# Patient Record
Sex: FEMALE | Race: WHITE | Employment: PART TIME | URBAN - METROPOLITAN AREA
[De-identification: names, ages, dates, MRNs, and addresses within clinical notes are randomized per-mention and may not be internally consistent; named-entity substitution may affect disease eponyms.]

---

## 2018-10-09 ENCOUNTER — APPOINTMENT (OUTPATIENT)
Dept: GENERAL RADIOLOGY | Age: 43
End: 2018-10-09
Attending: FAMILY MEDICINE
Payer: COMMERCIAL

## 2018-10-09 ENCOUNTER — HOSPITAL ENCOUNTER (OUTPATIENT)
Age: 43
Discharge: HOME | End: 2018-10-09
Attending: FAMILY MEDICINE
Payer: COMMERCIAL

## 2018-10-09 VITALS
WEIGHT: 125 LBS | HEART RATE: 119 BPM | OXYGEN SATURATION: 99 % | HEIGHT: 65 IN | TEMPERATURE: 100 F | DIASTOLIC BLOOD PRESSURE: 100 MMHG | RESPIRATION RATE: 16 BRPM | BODY MASS INDEX: 20.83 KG/M2 | SYSTOLIC BLOOD PRESSURE: 170 MMHG

## 2018-10-09 DIAGNOSIS — S61.412A LACERATION OF LEFT HAND, FOREIGN BODY PRESENCE UNSPECIFIED, INITIAL ENCOUNTER: Primary | ICD-10-CM

## 2018-10-09 PROCEDURE — 90471 IMMUNIZATION ADMIN: CPT

## 2018-10-09 PROCEDURE — 12002 RPR S/N/AX/GEN/TRNK2.6-7.5CM: CPT

## 2018-10-09 PROCEDURE — 99204 OFFICE O/P NEW MOD 45 MIN: CPT

## 2018-10-09 PROCEDURE — 73130 X-RAY EXAM OF HAND: CPT | Performed by: FAMILY MEDICINE

## 2018-10-09 PROCEDURE — 73660 X-RAY EXAM OF TOE(S): CPT | Performed by: FAMILY MEDICINE

## 2018-10-10 NOTE — ED PROVIDER NOTES
[place] [id number] [facility] [place]    History   Patient presents with:  Laceration    Stated Complaint: L. Hand Laceration    HPI    51-year-old female presents for left hand laceration.   States about 1 hour ago, she was in the shower and sh Psychiatric: She has a normal mood and affect. ED Course   Labs Reviewed - No data to display      MDM     Patient presents for laceration on the left hand. X ray left hand was done and was unremarkable.  Lidocaine without epi was infiltrated along

## 2018-10-10 NOTE — ED INITIAL ASSESSMENT (HPI)
Pt presents tonight with c/o laceration to top of left hand that occurred just pta. Pt states that she was in the shower and that the shower has a glass door. Pt states that the glass shattered and cut the top of her left hand.  Pt with laceration to top of

## 2024-01-22 ENCOUNTER — TELEPHONE (OUTPATIENT)
Dept: FAMILY MEDICINE CLINIC | Facility: CLINIC | Age: 49
End: 2024-01-22

## 2024-01-22 NOTE — TELEPHONE ENCOUNTER
Pt has appt on wed, she is hoping to get in tomorrow, she cannot get rid of the 100.3 fever, please call pt if Dr GRAY will see her tomorrow.

## 2024-01-22 NOTE — TELEPHONE ENCOUNTER
Future Appointments   Date Time Provider Department Center   1/23/2024  9:00 AM Daljit Valladares MD [facility] [place]

## 2024-01-22 NOTE — TELEPHONE ENCOUNTER
PT CALLED AND ADV HAS BEEN IN BEEN IN [place] THE LAST 3 DAYS - WOULD LIKE TO SEE DR HANCOCK.    HASN'T BEEN SEEN IN MANY MANY YEARS - IS  WILLING TO TAKE ON PT. PT ADV  SEE'S ALL THE CHILDREN.    PLEASE ADV    THANK YOU

## 2024-01-23 ENCOUNTER — HOSPITAL ENCOUNTER (OUTPATIENT)
Dept: GENERAL RADIOLOGY | Age: 49
Discharge: HOME | End: 2024-01-23
Attending: FAMILY MEDICINE

## 2024-01-23 ENCOUNTER — OFFICE VISIT (OUTPATIENT)
Dept: FAMILY MEDICINE CLINIC | Facility: CLINIC | Age: 49
End: 2024-01-23
Payer: COMMERCIAL

## 2024-01-23 VITALS
OXYGEN SATURATION: 98 % | WEIGHT: 139 LBS | TEMPERATURE: 98 F | BODY MASS INDEX: 23 KG/M2 | DIASTOLIC BLOOD PRESSURE: 120 MMHG | HEART RATE: 133 BPM | SYSTOLIC BLOOD PRESSURE: 140 MMHG | RESPIRATION RATE: 16 BRPM

## 2024-01-23 DIAGNOSIS — R05.1 ACUTE COUGH: Primary | ICD-10-CM

## 2024-01-23 DIAGNOSIS — R00.0 TACHYCARDIA: ICD-10-CM

## 2024-01-23 DIAGNOSIS — R05.1 ACUTE COUGH: ICD-10-CM

## 2024-01-23 DIAGNOSIS — R03.0 ELEVATED BLOOD PRESSURE READING: ICD-10-CM

## 2024-01-23 PROCEDURE — 3080F DIAST BP >= 90 MM HG: CPT | Performed by: FAMILY MEDICINE

## 2024-01-23 PROCEDURE — 71046 X-RAY EXAM CHEST 2 VIEWS: CPT | Performed by: FAMILY MEDICINE

## 2024-01-23 PROCEDURE — 99203 OFFICE O/P NEW LOW 30 MIN: CPT | Performed by: FAMILY MEDICINE

## 2024-01-23 PROCEDURE — 3077F SYST BP >= 140 MM HG: CPT | Performed by: FAMILY MEDICINE

## 2024-01-23 RX ORDER — BUDESONIDE 1 MG/2ML
1 INHALANT ORAL 2 TIMES DAILY
Qty: 30 EACH | Refills: 0 | Status: SHIPPED | OUTPATIENT
Start: 2024-01-23

## 2024-01-23 NOTE — PROGRESS NOTES
Denisse Parham is a 48 year old female.    CC:    Chief Complaint   Patient presents with    Chest Congestion       HPI:  Dry cough for about 4 days. Feels achy. + temps to around 100.5F. + flanagan. Has been in a nursing home multiple times the past 2 weeks. No chest pains. No home COVID testing done. Motrin/Tylenol of some help for aches and fevers.   Allergies:  No Known Allergies   Current Meds:  none     History:  No past medical history on file.   No past surgical history on file.   No family history on file.   Family Status   Relation Status    Mo Alive    Fa Alive      Social History     Socioeconomic History    Marital status:    Tobacco Use    Smoking status: Never    Smokeless tobacco: Never   Substance and Sexual Activity    Alcohol use: Yes     Comment: rare    Drug use: Never        ROS:  General: lower energy, drinking plenty of water   (female): making urine hourly    Vitals: BP (!) 140/120 (BP Location: Left arm, Patient Position: Sitting, Cuff Size: adult)   Pulse (!) 133   Temp 98.2 °F (36.8 °C)   Resp 16   Wt 139 lb (63 kg)   SpO2 98%   BMI 23.13 kg/m²    Reviewed by RUSH Valladares M.D.    Physical Exam:  GEN: mildly ill appearing  EYE: B conjunctiva and lids normal  HENT: normocephalic; normal nose, pharynx and TM's  NECK: No lymphadenopathy, thyromegaly or masses  CAR: S1, S2 normal, RRR; no S3, no S4; no click; murmur negative  PULM: clear to auscultation B, no accessory muscle use, + hoarse voice and dry cough during interview.  GI: not examined  PSYCH: alert and oriented x 3; affect appropriate  SKIN: not examined  BREAST: not examined/not applicable  EXTREMITIES: No clubbing, cyanosis or edema  RECTAL: not examined  GENITAL: not examined  LYMPH: no supraclavicular nodes  MUSCULOSKELETAL: normal ambulation  NEURO: Awake and alert. Normal speech and articulation. No facial droop or asymmetry. Moving all extremities equally.    ASSESSMENT AND PLAN    1. Acute cough  Pulmicort  Report given to HD RN Vandana. Pt to receive dialysis in ED. Plan of care reviewed with pt and daughter. Pt and daughter demonstrated understanding through teach back. Blood transfusion consent obtained by MD. Pt comfort maintained. nebs  Await results   To ER if sx worsen  - XR CHEST PA + LAT CHEST (CPT=71046); Future  - SARS-CoV-2/Flu A and B/RSV by PCR ([facility]) [E]; Future  - SARS-CoV-2/Flu A and B/RSV by PCR ([facility]) [E]    2. Elevated blood pressure reading  She has had a previously elevated BP in 2018 when reviewing the record. Her sisters have HTN. Denisse is likely a hypertensive as well. I have recommended she f/u when feeling well for a complete px to evaluate this further    3. Tachycardia  Likely due to current illness  We will have her re evaluated in near future when feeling better      No follow-ups on file.    Orders for this visit:    No orders of the defined types were placed in this encounter.      None    Meds & Refills for this Visit:  Requested Prescriptions     Signed Prescriptions Disp Refills    Budesonide (PULMICORT) 1 MG/2ML Inhalation Suspension 30 each 0     Sig: Take 2 mL (1 mg total) by nebulization 2 (two) times daily.             Authorized by Daljit Valladares M.D.

## 2024-01-24 LAB
FLUAV + FLUBV RNA SPEC NAA+PROBE: DETECTED
FLUAV + FLUBV RNA SPEC NAA+PROBE: NOT DETECTED
RSV RNA SPEC NAA+PROBE: NOT DETECTED
SARS-COV-2 RNA RESP QL NAA+PROBE: NOT DETECTED

## 2024-01-24 RX ORDER — OSELTAMIVIR PHOSPHATE 75 MG/1
75 CAPSULE ORAL 2 TIMES DAILY
Qty: 10 CAPSULE | Refills: 0 | Status: SHIPPED | OUTPATIENT
Start: 2024-01-24 | End: 2024-01-29

## 2024-08-21 ENCOUNTER — TELEPHONE (OUTPATIENT)
Dept: FAMILY MEDICINE CLINIC | Facility: CLINIC | Age: 49
End: 2024-08-21

## 2024-08-21 NOTE — TELEPHONE ENCOUNTER
PATIENT WAS INSTRUCTED TO SCHEDULE BP CHECK AND LABS WITH DR OLIVEIRA.  SHE IS SCHEDULED FOR FRIDAY FOR BP CHECK.  I DON'T SEE ANY LAB ORDERS.  PATIENT SAYS IT WAS A CASUAL CONVERSATION AFTER HER LAST APPOINTMENT TO FOLLOW UP.  DOES DR OLIVEIRA WANT PATIENT TO COME FASTING ON FRIDAY MORNING FOR LABS? DID DR OLIVEIRA NEED TO SEE HER OR IS THIS NURSE ONLY FOR BP CHECK.

## 2024-08-23 ENCOUNTER — OFFICE VISIT (OUTPATIENT)
Dept: FAMILY MEDICINE CLINIC | Facility: CLINIC | Age: 49
End: 2024-08-23
Payer: COMMERCIAL

## 2024-08-23 VITALS
WEIGHT: 139 LBS | SYSTOLIC BLOOD PRESSURE: 152 MMHG | TEMPERATURE: 98 F | OXYGEN SATURATION: 99 % | DIASTOLIC BLOOD PRESSURE: 102 MMHG | BODY MASS INDEX: 23 KG/M2 | HEART RATE: 135 BPM

## 2024-08-23 DIAGNOSIS — Z13.220 LIPID SCREENING: ICD-10-CM

## 2024-08-23 DIAGNOSIS — R00.0 TACHYCARDIA: ICD-10-CM

## 2024-08-23 DIAGNOSIS — R03.0 ELEVATED BLOOD PRESSURE READING: Primary | ICD-10-CM

## 2024-08-23 DIAGNOSIS — Z12.11 COLON CANCER SCREENING: ICD-10-CM

## 2024-08-23 DIAGNOSIS — Z12.31 BREAST CANCER SCREENING BY MAMMOGRAM: ICD-10-CM

## 2024-08-23 DIAGNOSIS — E04.1 THYROID CYST: ICD-10-CM

## 2024-08-23 LAB
ALT SERPL-CCNC: 12 U/L
ANION GAP SERPL CALC-SCNC: 3 MMOL/L (ref 0–18)
AST SERPL-CCNC: 16 U/L (ref ?–34)
ATRIAL RATE: 87 BPM
BUN BLD-MCNC: 9 MG/DL (ref 9–23)
CALCIUM BLD-MCNC: 10.2 MG/DL (ref 8.7–10.4)
CHLORIDE SERPL-SCNC: 102 MMOL/L (ref 98–112)
CHOLEST SERPL-MCNC: 192 MG/DL (ref ?–200)
CO2 SERPL-SCNC: 31 MMOL/L (ref 21–32)
CREAT BLD-MCNC: 0.76 MG/DL
EGFRCR SERPLBLD CKD-EPI 2021: 97 ML/MIN/1.73M2 (ref 60–?)
ERYTHROCYTE [DISTWIDTH] IN BLOOD BY AUTOMATED COUNT: 16.7 %
FASTING PATIENT LIPID ANSWER: YES
FASTING STATUS PATIENT QL REPORTED: YES
GLUCOSE BLD-MCNC: 99 MG/DL (ref 70–99)
HCT VFR BLD AUTO: 39.8 %
HDLC SERPL-MCNC: 75 MG/DL (ref 40–59)
HGB BLD-MCNC: 12.1 G/DL
LDLC SERPL CALC-MCNC: 104 MG/DL (ref ?–100)
MCH RBC QN AUTO: 24.3 PG (ref 26–34)
MCHC RBC AUTO-ENTMCNC: 30.4 G/DL (ref 31–37)
MCV RBC AUTO: 80.1 FL
NONHDLC SERPL-MCNC: 117 MG/DL (ref ?–130)
OSMOLALITY SERPL CALC.SUM OF ELEC: 281 MOSM/KG (ref 275–295)
P AXIS: 66 DEGREES
P-R INTERVAL: 150 MS
PLATELET # BLD AUTO: 361 10(3)UL (ref 150–450)
POTASSIUM SERPL-SCNC: 4.7 MMOL/L (ref 3.5–5.1)
Q-T INTERVAL: 370 MS
QRS DURATION: 82 MS
QTC CALCULATION (BEZET): 445 MS
R AXIS: -14 DEGREES
RBC # BLD AUTO: 4.97 X10(6)UL
SODIUM SERPL-SCNC: 136 MMOL/L (ref 136–145)
T AXIS: 27 DEGREES
T4 FREE SERPL-MCNC: 1.9 NG/DL (ref 0.8–1.7)
TRIGL SERPL-MCNC: 72 MG/DL (ref 30–149)
TSI SER-ACNC: 0.74 MIU/ML (ref 0.55–4.78)
VENTRICULAR RATE: 87 BPM
VLDLC SERPL CALC-MCNC: 12 MG/DL (ref 0–30)
WBC # BLD AUTO: 6.2 X10(3) UL (ref 4–11)

## 2024-08-23 PROCEDURE — 3080F DIAST BP >= 90 MM HG: CPT | Performed by: FAMILY MEDICINE

## 2024-08-23 PROCEDURE — 84439 ASSAY OF FREE THYROXINE: CPT | Performed by: FAMILY MEDICINE

## 2024-08-23 PROCEDURE — 84460 ALANINE AMINO (ALT) (SGPT): CPT | Performed by: FAMILY MEDICINE

## 2024-08-23 PROCEDURE — 85027 COMPLETE CBC AUTOMATED: CPT | Performed by: FAMILY MEDICINE

## 2024-08-23 PROCEDURE — 93000 ELECTROCARDIOGRAM COMPLETE: CPT | Performed by: FAMILY MEDICINE

## 2024-08-23 PROCEDURE — 80048 BASIC METABOLIC PNL TOTAL CA: CPT | Performed by: FAMILY MEDICINE

## 2024-08-23 PROCEDURE — 84443 ASSAY THYROID STIM HORMONE: CPT | Performed by: FAMILY MEDICINE

## 2024-08-23 PROCEDURE — 84450 TRANSFERASE (AST) (SGOT): CPT | Performed by: FAMILY MEDICINE

## 2024-08-23 PROCEDURE — 99214 OFFICE O/P EST MOD 30 MIN: CPT | Performed by: FAMILY MEDICINE

## 2024-08-23 PROCEDURE — 80061 LIPID PANEL: CPT | Performed by: FAMILY MEDICINE

## 2024-08-23 PROCEDURE — 3077F SYST BP >= 140 MM HG: CPT | Performed by: FAMILY MEDICINE

## 2024-08-23 NOTE — PROGRESS NOTES
Denisse Parham is a 48 year old female.    CC:    Chief Complaint   Patient presents with    Follow - Up       HPI:  F/u elevated BP from earlier this year. She has been tracking it the past 6 weeks. Review of the BP readings show that she is > 140/90 about 50% of the time. She is also noting a racing heart. No chest pain nor syncope. She does not drink caffeine, no decongestant use. No energy supplements. No tob use. Rare etoh. She admits to being a higher energy person. She has a strong family hx of HTN. She recalls being on synthroid about 25 years ago. She also states she had a thyroid bx on a R sided nodule/cyst about 25 years ago. She is due for cervical, breast and colon cancer screens.     Allergies:  No Known Allergies   Current Meds:  Current Outpatient Medications   Medication Sig Dispense Refill    Budesonide (PULMICORT) 1 MG/2ML Inhalation Suspension Take 2 mL (1 mg total) by nebulization 2 (two) times daily. 30 each 0        History:  No past medical history on file.   No past surgical history on file.   No family history on file.   Family Status   Relation Status    Mo Alive    Fa Alive      Social History     Socioeconomic History    Marital status:    Tobacco Use    Smoking status: Never    Smokeless tobacco: Never   Substance and Sexual Activity    Alcohol use: Yes     Comment: rare    Drug use: Never        ROS:  General: energy level stable  Cardiovascular/Pulses: Denies exertional chest pain or pressure, lower extremity edema  Respiratory: Denies dyspnea, wheezing. She does get  slight cough when her heart rate is elevated.     Vitals: BP (!) 148/102   Pulse (!) 135   Temp 97.7 °F (36.5 °C) (Temporal)   Wt 139 lb (63 kg)   LMP 08/16/2024 (Exact Date)   SpO2 99%   BMI 23.13 kg/m²    Reviewed by RUSH Valladares M.D.  BP Readings from Last 3 Encounters:   08/23/24 (!) 152/102   01/23/24 (!) 140/120   10/09/18 (!) 170/100      Physical Exam:  GEN: well developed, well nourished, in no  apparent distress  EYE: B conjunctiva and lids normal  HENT: normocephalic; normal nose, pharynx and TM's  NECK: R sided thyroid mass, No cervical nodes  CAR: S1, S2 normal, tachycardic but regular; no S3, no S4; no click; murmur negative  PULM: clear to auscultation B, no accessory muscle use  GI: normal active BS+, soft, nondistended; no HSM; no masses; no bruits; no masses; nontender, no G/R/R   PSYCH: alert and oriented x 3; affect appropriate  SKIN: not examined  EXTREMITIES: No clubbing, cyanosis or edema  GENITAL: not examined  LYMPH: no supraclavicular nodes  NEURO: Awake and alert. Normal speech and articulation. No facial droop or asymmetry. Moving all extremities equally. Symmetric B patellar DTRs, no hand tremor    ASSESSMENT AND PLAN    1. Elevated blood pressure reading  Await results, if fine then I will likely recommend BB to help with BP and tachycardia    - TSH and Free T4; Future  - ELECTROCARDIOGRAM, COMPLETE  - ALT(SGPT)  - AST (SGOT)  - Basic Metabolic Panel (8)  - CBC, Platelet; No Differential  - Lipid Panel  - TSH and Free T4    2. Tachycardia  As above     - ELECTROCARDIOGRAM, COMPLETE  - ALT(SGPT)  - AST (SGOT)  - Basic Metabolic Panel (8)  - CBC, Platelet; No Differential  - Lipid Panel  - TSH and Free T4    3. Thyroid cyst  Await results   - US THYROID (CPT=76536); Future    4. Breast cancer screening by mammogram  Await results   - [facility] CARMINE 2D+3D SCREENING BILAT (CPT=77067/05524); Future    5. Colon cancer screening  Await results   - COLOGUARD COLON CANCER SCREENING (EXTERNAL)    6. Lipid screening  Await results   - Lipid Panel; Future  - Lipid Panel    7. Cervical Cancer Screening  I recommended she meet with one of our female providers in the office to accomplish a Pap smear and HPV screen      No follow-ups on file.    Orders for this visit:    Orders Placed This Encounter   Procedures    ALT(SGPT)     Standing Status:   Future     Number of Occurrences:   1     Standing Expiration  Date:   8/23/2025    AST (SGOT)     Standing Status:   Future     Number of Occurrences:   1     Standing Expiration Date:   8/23/2025    Basic Metabolic Panel (8)     Standing Status:   Future     Number of Occurrences:   1     Standing Expiration Date:   8/23/2025    CBC, Platelet; No Differential     Standing Status:   Future     Number of Occurrences:   1     Standing Expiration Date:   8/23/2025    Lipid Panel     Standing Status:   Future     Number of Occurrences:   1     Standing Expiration Date:   8/23/2025    TSH and Free T4     Standing Status:   Future     Number of Occurrences:   1     Standing Expiration Date:   8/23/2025    VENIPUNCTURE     Order Specific Question:   Release to patient     Answer:   Immediate       COLOGUARD COLON CANCER SCREENING (EXTERNAL)  ELECTROCARDIOGRAM, COMPLETE  [facility] CARMINE 2D+3D SCREENING BILAT (CPT=77067/98319)  US THYROID (CPT=76536)    Meds & Refills for this Visit:  Requested Prescriptions      No prescriptions requested or ordered in this encounter             Authorized by Daljit Valladares M.D.

## 2024-08-24 DIAGNOSIS — R79.89 ELEVATED SERUM FREE T4 LEVEL: Primary | ICD-10-CM

## 2024-08-24 DIAGNOSIS — E04.1 THYROID CYST: ICD-10-CM

## 2024-08-24 DIAGNOSIS — R00.0 TACHYCARDIA: ICD-10-CM

## 2024-08-24 RX ORDER — METOPROLOL SUCCINATE 25 MG/1
25 TABLET, EXTENDED RELEASE ORAL DAILY
Qty: 90 TABLET | Refills: 0 | Status: SHIPPED | OUTPATIENT
Start: 2024-08-24

## 2024-08-26 ENCOUNTER — LAB ENCOUNTER (OUTPATIENT)
Dept: LAB | Age: 49
End: 2024-08-26
Attending: FAMILY MEDICINE
Payer: COMMERCIAL

## 2024-08-26 DIAGNOSIS — E04.1 THYROID NODULE: ICD-10-CM

## 2024-08-26 DIAGNOSIS — R79.89 ELEVATED SERUM FREE T4 LEVEL: ICD-10-CM

## 2024-08-26 DIAGNOSIS — R00.0 TACHYCARDIA: ICD-10-CM

## 2024-08-26 DIAGNOSIS — R79.0 LOW FERRITIN: Primary | ICD-10-CM

## 2024-08-26 DIAGNOSIS — E04.1 THYROID CYST: ICD-10-CM

## 2024-08-26 LAB
DEPRECATED HBV CORE AB SER IA-ACNC: 2.4 NG/ML
T4 FREE SERPL-MCNC: 1.3 NG/DL (ref 0.8–1.7)
THYROGLOB SERPL-MCNC: <15 U/ML (ref ?–60)
THYROPEROXIDASE AB SERPL-ACNC: <28 U/ML (ref ?–60)
TSI SER-ACNC: 0.7 MIU/ML (ref 0.55–4.78)

## 2024-08-26 PROCEDURE — 82728 ASSAY OF FERRITIN: CPT | Performed by: FAMILY MEDICINE

## 2024-08-26 PROCEDURE — 86376 MICROSOMAL ANTIBODY EACH: CPT | Performed by: FAMILY MEDICINE

## 2024-08-26 PROCEDURE — 84443 ASSAY THYROID STIM HORMONE: CPT | Performed by: FAMILY MEDICINE

## 2024-08-26 PROCEDURE — 83520 IMMUNOASSAY QUANT NOS NONAB: CPT | Performed by: FAMILY MEDICINE

## 2024-08-26 PROCEDURE — 86800 THYROGLOBULIN ANTIBODY: CPT | Performed by: FAMILY MEDICINE

## 2024-08-26 PROCEDURE — 84445 ASSAY OF TSI GLOBULIN: CPT | Performed by: FAMILY MEDICINE

## 2024-08-26 PROCEDURE — 84439 ASSAY OF FREE THYROXINE: CPT | Performed by: FAMILY MEDICINE

## 2024-08-29 LAB
THY STIM IMMUNO: <0.1 IU/L
THYROTROPIN REC AB: <1.1 IU/L

## 2024-09-12 ENCOUNTER — HOSPITAL ENCOUNTER (OUTPATIENT)
Dept: ULTRASOUND IMAGING | Age: 49
Discharge: HOME | End: 2024-09-12
Attending: FAMILY MEDICINE
Payer: COMMERCIAL

## 2024-09-12 DIAGNOSIS — E04.1 THYROID CYST: ICD-10-CM

## 2024-09-12 PROCEDURE — 76536 US EXAM OF HEAD AND NECK: CPT | Performed by: FAMILY MEDICINE

## 2024-09-18 ENCOUNTER — OFFICE VISIT (OUTPATIENT)
Dept: FAMILY MEDICINE CLINIC | Facility: CLINIC | Age: 49
End: 2024-09-18
Payer: COMMERCIAL

## 2024-09-18 VITALS
HEART RATE: 103 BPM | OXYGEN SATURATION: 97 % | WEIGHT: 138.81 LBS | SYSTOLIC BLOOD PRESSURE: 148 MMHG | BODY MASS INDEX: 23 KG/M2 | DIASTOLIC BLOOD PRESSURE: 88 MMHG | TEMPERATURE: 99 F

## 2024-09-18 DIAGNOSIS — I10 HYPERTENSION, UNSPECIFIED TYPE: Primary | ICD-10-CM

## 2024-09-18 DIAGNOSIS — R79.0 LOW FERRITIN: ICD-10-CM

## 2024-09-18 DIAGNOSIS — E04.1 THYROID NODULE: ICD-10-CM

## 2024-09-18 PROCEDURE — 99214 OFFICE O/P EST MOD 30 MIN: CPT | Performed by: FAMILY MEDICINE

## 2024-09-18 PROCEDURE — 84443 ASSAY THYROID STIM HORMONE: CPT | Performed by: FAMILY MEDICINE

## 2024-09-18 PROCEDURE — 82728 ASSAY OF FERRITIN: CPT | Performed by: FAMILY MEDICINE

## 2024-09-18 PROCEDURE — 3077F SYST BP >= 140 MM HG: CPT | Performed by: FAMILY MEDICINE

## 2024-09-18 PROCEDURE — 84439 ASSAY OF FREE THYROXINE: CPT | Performed by: FAMILY MEDICINE

## 2024-09-18 PROCEDURE — 3079F DIAST BP 80-89 MM HG: CPT | Performed by: FAMILY MEDICINE

## 2024-09-18 RX ORDER — METOPROLOL SUCCINATE 25 MG/1
25 TABLET, EXTENDED RELEASE ORAL 2 TIMES DAILY
COMMUNITY
Start: 2024-09-18

## 2024-09-18 NOTE — PROGRESS NOTES
Denisse Parham is a 48 year old female.    CC:    Chief Complaint   Patient presents with    Follow - Up     On BP.        HPI:  Here to follow up hypertension  Home BP readings: 120s-160s/ 80s-101  Medication side effects: none  Symptoms: headache  Circumstantial factors: none    Lab Results   Component Value Date    GLU 99 08/23/2024    BUN 9 08/23/2024    CREATSERUM 0.76 08/23/2024    ANIONGAP 3 08/23/2024    CA 10.2 08/23/2024     08/23/2024    K 4.7 08/23/2024     08/23/2024    CO2 31.0 08/23/2024    OSMOCALC 281 08/23/2024       BP Readings from Last 3 Encounters:   09/18/24 148/88   08/23/24 (!) 152/102   01/23/24 (!) 140/120      Allergies:  No Known Allergies   Current Meds:  Current Outpatient Medications   Medication Sig Dispense Refill    metoprolol succinate ER (TOPROL XL) 25 MG Oral Tablet 24 Hr Take 1 tablet (25 mg total) by mouth daily. 90 tablet 0    Budesonide (PULMICORT) 1 MG/2ML Inhalation Suspension Take 2 mL (1 mg total) by nebulization 2 (two) times daily. 30 each 0        History:  No past medical history on file.   No past surgical history on file.   No family history on file.   Family Status   Relation Status    Mo Alive    Fa Alive      Social History     Socioeconomic History    Marital status:    Tobacco Use    Smoking status: Never    Smokeless tobacco: Never   Substance and Sexual Activity    Alcohol use: Yes     Comment: rare    Drug use: Never        ROS:  General: energy level stable  ENT: will be seeing ENT for a complex thyroid nodule  Heme: needs f/u ferritin, taking iron supplement as directed     Vitals: /88   Pulse 103   Temp 98.7 °F (37.1 °C) (Temporal)   Wt 138 lb 12.8 oz (63 kg)   LMP 09/11/2024 (Approximate)   SpO2 97%   BMI 23.10 kg/m²    Reviewed by RUSH Valladares M.D.    Physical Exam:  GEN: well developed, well nourished, in no apparent distress  EYE: B conjunctiva and lids normal  HENT: No oral lesions.   NECK: R thyroid nodule, no  LAD  CAR: S1, S2 normal, RRR; no S3, no S4; no click; murmur negative  PULM: clear to auscultation B, no accessory muscle use  GI: not examined  PSYCH: alert and oriented x 3; affect appropriate  SKIN: not examined  EXTREMITIES: No clubbing, cyanosis or edema  GENITAL: not examined  LYMPH: no supraclavicular nodes  NEURO: Awake and alert. Normal speech and articulation. No facial droop or asymmetry. Moving all extremities equally.    ASSESSMENT AND PLAN    1. Hypertension, unspecified type  Increase Toprol to BID dose  F/u in 4 weeks    2. Thyroid nodule  Await results   Keep appt with ENT  - TSH and Free T4    3. Low ferritin  As above   - Ferritin      No follow-ups on file.    Orders for this visit:    No orders of the defined types were placed in this encounter.      None    Meds & Refills for this Visit:  Requested Prescriptions      No prescriptions requested or ordered in this encounter             Authorized by Daljit Valladares M.D.

## 2024-09-19 LAB
DEPRECATED HBV CORE AB SER IA-ACNC: 11.5 NG/ML
T4 FREE SERPL-MCNC: 1.7 NG/DL (ref 0.8–1.7)
TSI SER-ACNC: 0.73 MIU/ML (ref 0.55–4.78)

## 2024-10-17 ENCOUNTER — OFFICE VISIT (OUTPATIENT)
Dept: FAMILY MEDICINE CLINIC | Facility: CLINIC | Age: 49
End: 2024-10-17
Payer: COMMERCIAL

## 2024-10-17 ENCOUNTER — PATIENT MESSAGE (OUTPATIENT)
Dept: FAMILY MEDICINE CLINIC | Facility: CLINIC | Age: 49
End: 2024-10-17

## 2024-10-17 VITALS
WEIGHT: 143.38 LBS | BODY MASS INDEX: 24 KG/M2 | TEMPERATURE: 98 F | SYSTOLIC BLOOD PRESSURE: 155 MMHG | HEART RATE: 94 BPM | DIASTOLIC BLOOD PRESSURE: 100 MMHG | OXYGEN SATURATION: 99 %

## 2024-10-17 DIAGNOSIS — R79.0 LOW FERRITIN: ICD-10-CM

## 2024-10-17 DIAGNOSIS — I10 HYPERTENSION, UNSPECIFIED TYPE: Primary | ICD-10-CM

## 2024-10-17 LAB — DEPRECATED HBV CORE AB SER IA-ACNC: 10 NG/ML

## 2024-10-17 PROCEDURE — 3080F DIAST BP >= 90 MM HG: CPT | Performed by: FAMILY MEDICINE

## 2024-10-17 PROCEDURE — 82728 ASSAY OF FERRITIN: CPT | Performed by: FAMILY MEDICINE

## 2024-10-17 PROCEDURE — 3077F SYST BP >= 140 MM HG: CPT | Performed by: FAMILY MEDICINE

## 2024-10-17 PROCEDURE — 99214 OFFICE O/P EST MOD 30 MIN: CPT | Performed by: FAMILY MEDICINE

## 2024-10-17 RX ORDER — OLMESARTAN MEDOXOMIL 20 MG/1
20 TABLET ORAL DAILY
Qty: 90 TABLET | Refills: 0 | Status: SHIPPED | OUTPATIENT
Start: 2024-10-17

## 2024-10-17 RX ORDER — METOPROLOL SUCCINATE 25 MG/1
25 TABLET, EXTENDED RELEASE ORAL 2 TIMES DAILY
Qty: 180 TABLET | Refills: 0 | Status: SHIPPED | OUTPATIENT
Start: 2024-10-17

## 2024-10-17 NOTE — TELEPHONE ENCOUNTER
PT CALLED AND ADV IS OUT OF MEDICATION - FORGOT TO REQUEST AT APT:    metoprolol succinate ER (TOPROL XL) 25 MG Oral Tablet 24 Hr -- -- 9/18/2024 --   Sig:   Take 1 tablet (25 mg total) by mouth 2 (two) times daily.       PLEASE SEND TO MARLY CORREA    THANK YOU

## 2024-10-17 NOTE — PROGRESS NOTES
Denisse Parham is a 48 year old female.    CC:    Chief Complaint   Patient presents with    Blood Pressure       HPI:  Here to follow up hypertension  Home BP readings: majority of time above 140/90  Medication side effects: fatigue  Symptoms: headache  Circumstantial factors: none    Lab Results   Component Value Date    GLU 99 08/23/2024    BUN 9 08/23/2024    CREATSERUM 0.76 08/23/2024    ANIONGAP 3 08/23/2024    CA 10.2 08/23/2024     08/23/2024    K 4.7 08/23/2024     08/23/2024    CO2 31.0 08/23/2024    OSMOCALC 281 08/23/2024       BP Readings from Last 3 Encounters:   10/17/24 (!) 160/100   09/18/24 148/88   08/23/24 (!) 152/102      Allergies:  Allergies[1]   Current Meds:  Current Outpatient Medications   Medication Sig Dispense Refill    metoprolol succinate ER (TOPROL XL) 25 MG Oral Tablet 24 Hr Take 1 tablet (25 mg total) by mouth 2 (two) times daily.      Budesonide (PULMICORT) 1 MG/2ML Inhalation Suspension Take 2 mL (1 mg total) by nebulization 2 (two) times daily. 30 each 0        History:  No past medical history on file.   No past surgical history on file.   No family history on file.   Family Status   Relation Status    Mo Alive    Fa Alive      Social History     Socioeconomic History    Marital status:    Tobacco Use    Smoking status: Never    Smokeless tobacco: Never   Substance and Sexual Activity    Alcohol use: Yes     Comment: rare    Drug use: Never        ROS:  General: appetite stable   Cardiovascular/Pulses: Denies chest pain    Vitals: BP (!) 160/100   Pulse 94   Temp 98.3 °F (36.8 °C) (Temporal)   Wt 143 lb 6.4 oz (65 kg)   LMP 10/04/2024 (Approximate)   SpO2 99%   BMI 23.86 kg/m²    Reviewed by RUSH Valladares M.D.    Physical Exam:  GEN: well developed, well nourished, in no apparent distress  EYE: B conjunctiva and lids normal  HENT: ---  NECK: No lymphadenopathy, thyromegaly or masses  CAR: S1, S2 normal, RRR; no S3, no S4; no click; murmur  negative  PULM: clear to auscultation B, no accessory muscle use  GI: not examined  PSYCH: alert and oriented x 3; affect appropriate  SKIN: not examined  EXTREMITIES: No clubbing, cyanosis or edema  GENITAL: not examined  LYMPH: no supraclavicular nodes  NEURO: Awake and alert. Normal speech and articulation. No facial droop or asymmetry. Moving all extremities equally.    ASSESSMENT AND PLAN    1. Hypertension, unspecified type  Add:  - olmesartan 20 MG Oral Tab; Take 1 tablet (20 mg total) by mouth daily.  Dispense: 90 tablet; Refill: 0    Continue BB    She will MC me home BP readings in 2 weeks. If still consistently > 140/90 then will increase Olmesartan to 40 mg every day   2. Low ferritin  Await results   - Ferritin; Future  - VENIPUNCTURE  - Ferritin      No follow-ups on file.    Orders for this visit:    Orders Placed This Encounter   Procedures    Ferritin     Standing Status:   Future     Number of Occurrences:   1     Standing Expiration Date:   10/17/2025    VENIPUNCTURE     Order Specific Question:   Release to patient     Answer:   Immediate       None    Meds & Refills for this Visit:  Requested Prescriptions     Signed Prescriptions Disp Refills    olmesartan 20 MG Oral Tab 90 tablet 0     Sig: Take 1 tablet (20 mg total) by mouth daily.             Authorized by Daljit Valladares M.D.           [1] No Known Allergies

## 2024-10-18 ENCOUNTER — PATIENT MESSAGE (OUTPATIENT)
Dept: FAMILY MEDICINE CLINIC | Facility: CLINIC | Age: 49
End: 2024-10-18

## 2024-10-21 ENCOUNTER — OFFICE VISIT (OUTPATIENT)
Facility: LOCATION | Age: 49
End: 2024-10-21
Payer: COMMERCIAL

## 2024-10-21 ENCOUNTER — PATIENT MESSAGE (OUTPATIENT)
Dept: FAMILY MEDICINE CLINIC | Facility: CLINIC | Age: 49
End: 2024-10-21

## 2024-10-21 DIAGNOSIS — E04.2 MULTIPLE THYROID NODULES: Primary | ICD-10-CM

## 2024-10-21 PROCEDURE — 99204 OFFICE O/P NEW MOD 45 MIN: CPT | Performed by: OTOLARYNGOLOGY

## 2024-10-21 NOTE — PROGRESS NOTES
Denisse Parham is a 48 year old female.   Chief Complaint   Patient presents with    Thyroid Problem     HPI:   48-year-old white female has known about thyroid nodules for about 25 years.  Around that time she had a cyst aspirated.  She had it reevaluated recently and she has cyst and/or nodules bilaterally.  She has a family history in which her aunt had her thyroid removed for nodules but no cancer.  Patient does not take thyroid medications.  Has no history of radiation to the head neck area.  These nodules are asymptomatic.  Current Outpatient Medications   Medication Sig Dispense Refill    olmesartan 20 MG Oral Tab Take 1 tablet (20 mg total) by mouth daily. 90 tablet 0    metoprolol succinate ER (TOPROL XL) 25 MG Oral Tablet 24 Hr Take 1 tablet (25 mg total) by mouth 2 (two) times daily. 180 tablet 0    Budesonide (PULMICORT) 1 MG/2ML Inhalation Suspension Take 2 mL (1 mg total) by nebulization 2 (two) times daily. 30 each 0      History reviewed. No pertinent past medical history.   Social History:  Social History     Socioeconomic History    Marital status:    Tobacco Use    Smoking status: Never    Smokeless tobacco: Never   Substance and Sexual Activity    Alcohol use: Yes     Comment: rare    Drug use: Never      History reviewed. No pertinent surgical history.      REVIEW OF SYSTEMS:   GENERAL HEALTH: feels well otherwise  GENERAL : denies fever, chills, sweats, weight loss, weight gain  SKIN: denies any unusual skin lesions or rashes  RESPIRATORY: denies shortness of breath with exertion  NEURO: denies headaches    EXAM:   LMP 10/04/2024 (Approximate)     System Pertinent findings Details   Constitutional  Overall appearance - Normal.   Psychiatric  Orientation - Oriented to time, place, person & situation. Appropriate mood and affect.   Head/Face  Facial features -- Normal. Skull - Normal.   Eyes  Pupils equal ,round ,react to light and accomidate   Ears  External Ear Right: Normal, Left:  Normal. Canal - Right: Normal, Left: Normal. TM - Right: Normal left: Normal   Nose  External Nose, Normal, Septum -midline nasal Vault, clear,Turbinates - Right: Normal left: Normal   Mouth/Throat  Lips/teeth/gums - Normal. Tonsils -1+. Oropharynx - Normal.   Neck Exam  Inspection - Normal. Palpation - Normal. Parotid gland - Normal. Thyroid gland -easily palpable right thyroid nodule other nodules are not so easy to palpate.   Neurological  Cranial nerves - Cranial nerves II through XII grossly intact.   Nasopharynx   Normal        Lymph Detail  Submental. Submandibular. Anterior cervical. Posterior cervical. Supraclavicular.   Thyroid ultrasound dated 9/12/2024  Right thyroid  3 x 2.7 x 2 cm right midpole complex  7 x 6 x 7 mm right superior pole  Left thyroid  11 x 7 x 7 mm left midpole with calcifications solid    ASSESSMENT AND PLAN:   1. Multiple thyroid nodules  Biopsy of the largest nodule right and left  Results over the phone pending results further recommendation        The patient indicates understanding of these issues and agrees to the plan.      Walter Melvin MD  10/21/2024  4:45 PM

## 2024-10-29 ENCOUNTER — HOSPITAL ENCOUNTER (OUTPATIENT)
Dept: ULTRASOUND IMAGING | Facility: HOSPITAL | Age: 49
Discharge: HOME | End: 2024-10-29
Attending: OTOLARYNGOLOGY
Payer: COMMERCIAL

## 2024-10-29 DIAGNOSIS — E04.2 MULTIPLE THYROID NODULES: ICD-10-CM

## 2024-10-29 PROCEDURE — 10006 FNA BX W/US GDN EA ADDL: CPT | Performed by: OTOLARYNGOLOGY

## 2024-10-29 PROCEDURE — 10005 FNA BX W/US GDN 1ST LES: CPT | Performed by: OTOLARYNGOLOGY

## 2024-10-29 PROCEDURE — 88173 CYTOPATH EVAL FNA REPORT: CPT | Performed by: OTOLARYNGOLOGY

## 2024-11-01 ENCOUNTER — PATIENT MESSAGE (OUTPATIENT)
Dept: FAMILY MEDICINE CLINIC | Facility: CLINIC | Age: 49
End: 2024-11-01

## 2024-11-11 ENCOUNTER — TELEPHONE (OUTPATIENT)
Facility: LOCATION | Age: 49
End: 2024-11-11

## 2024-11-11 NOTE — TELEPHONE ENCOUNTER
Results of recent fine-needle aspiration thyroid biopsies x 2 were given to the patient there were both benign.  Therefore recommending a 1 year follow-up thyroid ultrasound with a return office visit patient understood these results and will be sent a reminder via [facility]

## 2025-01-03 ENCOUNTER — OFFICE VISIT (OUTPATIENT)
Dept: FAMILY MEDICINE CLINIC | Facility: CLINIC | Age: 50
End: 2025-01-03
Payer: COMMERCIAL

## 2025-01-03 VITALS
TEMPERATURE: 98 F | BODY MASS INDEX: 24 KG/M2 | WEIGHT: 142.19 LBS | OXYGEN SATURATION: 97 % | SYSTOLIC BLOOD PRESSURE: 110 MMHG | HEART RATE: 81 BPM | DIASTOLIC BLOOD PRESSURE: 80 MMHG

## 2025-01-03 DIAGNOSIS — I10 HYPERTENSION, UNSPECIFIED TYPE: Primary | ICD-10-CM

## 2025-01-03 DIAGNOSIS — R79.0 LOW FERRITIN: ICD-10-CM

## 2025-01-03 LAB
ANION GAP SERPL CALC-SCNC: 5 MMOL/L (ref 0–18)
BUN BLD-MCNC: 14 MG/DL (ref 9–23)
CALCIUM BLD-MCNC: 9.7 MG/DL (ref 8.7–10.4)
CHLORIDE SERPL-SCNC: 104 MMOL/L (ref 98–112)
CO2 SERPL-SCNC: 29 MMOL/L (ref 21–32)
CREAT BLD-MCNC: 0.78 MG/DL
DEPRECATED HBV CORE AB SER IA-ACNC: 15 NG/ML
EGFRCR SERPLBLD CKD-EPI 2021: 93 ML/MIN/1.73M2 (ref 60–?)
ERYTHROCYTE [DISTWIDTH] IN BLOOD BY AUTOMATED COUNT: 12.2 %
FASTING STATUS PATIENT QL REPORTED: YES
GLUCOSE BLD-MCNC: 84 MG/DL (ref 70–99)
HCT VFR BLD AUTO: 44.1 %
HGB BLD-MCNC: 14.7 G/DL
MCH RBC QN AUTO: 31.9 PG (ref 26–34)
MCHC RBC AUTO-ENTMCNC: 33.3 G/DL (ref 31–37)
MCV RBC AUTO: 95.7 FL
OSMOLALITY SERPL CALC.SUM OF ELEC: 286 MOSM/KG (ref 275–295)
PLATELET # BLD AUTO: 260 10(3)UL (ref 150–450)
POTASSIUM SERPL-SCNC: 4.3 MMOL/L (ref 3.5–5.1)
RBC # BLD AUTO: 4.61 X10(6)UL
SODIUM SERPL-SCNC: 138 MMOL/L (ref 136–145)
WBC # BLD AUTO: 5.4 X10(3) UL (ref 4–11)

## 2025-01-03 PROCEDURE — 3079F DIAST BP 80-89 MM HG: CPT | Performed by: FAMILY MEDICINE

## 2025-01-03 PROCEDURE — 80048 BASIC METABOLIC PNL TOTAL CA: CPT | Performed by: FAMILY MEDICINE

## 2025-01-03 PROCEDURE — 85027 COMPLETE CBC AUTOMATED: CPT | Performed by: FAMILY MEDICINE

## 2025-01-03 PROCEDURE — 3074F SYST BP LT 130 MM HG: CPT | Performed by: FAMILY MEDICINE

## 2025-01-03 PROCEDURE — 99214 OFFICE O/P EST MOD 30 MIN: CPT | Performed by: FAMILY MEDICINE

## 2025-01-03 PROCEDURE — 82728 ASSAY OF FERRITIN: CPT | Performed by: FAMILY MEDICINE

## 2025-01-03 RX ORDER — METOPROLOL SUCCINATE 25 MG/1
25 TABLET, EXTENDED RELEASE ORAL DAILY
COMMUNITY
Start: 2025-01-03

## 2025-01-03 NOTE — PROGRESS NOTES
Denisse Parham is a 49 year old female.    CC:    Chief Complaint   Patient presents with    Follow - Up       HPI:  Here to follow up hypertension. Olmesartan added to BB about 2 months ago.  Home BP readings: Consistently < 140/90  Medication side effects: she does note a profound tiredness about an hour after taking the night time BB. She also doses Olmesartan at night  Symptoms: as above  Circumstantial factors: none    Lab Results   Component Value Date    GLU 99 08/23/2024    BUN 9 08/23/2024    CREATSERUM 0.76 08/23/2024    ANIONGAP 3 08/23/2024    CA 10.2 08/23/2024     08/23/2024    K 4.7 08/23/2024     08/23/2024    CO2 31.0 08/23/2024    OSMOCALC 281 08/23/2024       BP Readings from Last 3 Encounters:   01/03/25 110/80   10/17/24 (!) 155/100   09/18/24 148/88        Allergies as of 01/03/2025    (No Known Allergies)        Current Meds:  Current Outpatient Medications   Medication Sig Dispense Refill    olmesartan 20 MG Oral Tab Take 1 tablet (20 mg total) by mouth daily. 90 tablet 0    metoprolol succinate ER (TOPROL XL) 25 MG Oral Tablet 24 Hr Take 1 tablet (25 mg total) by mouth 2 (two) times daily. 180 tablet 0    Budesonide (PULMICORT) 1 MG/2ML Inhalation Suspension Take 2 mL (1 mg total) by nebulization 2 (two) times daily. 30 each 0        History:  History reviewed. No pertinent past medical history.   History reviewed. No pertinent surgical history.   History reviewed. No pertinent family history.   Family Status   Relation Status    Mo Alive    Fa Alive      Social History     Socioeconomic History    Marital status:    Tobacco Use    Smoking status: Never    Smokeless tobacco: Never   Substance and Sexual Activity    Alcohol use: Yes     Comment: rare    Drug use: Never        ROS:  General: wt stable   Cardiovascular/Pulses: Denies exertional chest pain or pressure  Heme: due for f/u ferritin level, she did not tolerate BID iron supplement as it caused GI upset.      Vitals: /80   Pulse 81   Temp 97.9 °F (36.6 °C) (Temporal)   Wt 142 lb 3.2 oz (64.5 kg)   LMP 12/16/2024 (Approximate)   SpO2 97%   BMI 23.66 kg/m²    Reviewed by RUSH Valladares M.D.    Physical Exam:  GEN: well developed, well nourished, in no apparent distress  EYE: B conjunctiva and lids normal  HENT: No oral lesions.   NECK: No lymphadenopathy, thyromegaly or masses  CAR: S1, S2 normal, RRR; no S3, no S4; no click; murmur negative  PULM: clear to auscultation B, no accessory muscle use  GI: not examined  PSYCH: alert and oriented x 3; affect appropriate  SKIN: not examined  EXTREMITIES: No clubbing, cyanosis or edema  GENITAL: not examined  LYMPH: no supraclavicular nodes  NEURO: Awake and alert. Normal speech and articulation. No facial droop or asymmetry. Moving all extremities equally.    ASSESSMENT AND PLAN    1. Hypertension, unspecified type  Nicely improved  Will drop the PM dose of BB  She is to My Chart Message me BP readings in 2-3 weeks  Await results   - Basic Metabolic Panel (8); Future    2. Low ferritin  Await results   - VENIPUNCTURE  - CBC, Platelet; No Differential; Future  - Ferritin; Future      No follow-ups on file.    Orders for this visit:    Orders Placed This Encounter   Procedures    CBC, Platelet; No Differential     Standing Status:   Future     Standing Expiration Date:   1/3/2026    Basic Metabolic Panel (8)     Standing Status:   Future     Standing Expiration Date:   1/3/2026    Ferritin     Standing Status:   Future     Standing Expiration Date:   1/3/2026    VENIPUNCTURE     Order Specific Question:   Release to patient     Answer:   Immediate       None    Meds & Refills for this Visit:  Requested Prescriptions      No prescriptions requested or ordered in this encounter             Authorized by Daljit Valladares M.D.

## 2025-01-11 DIAGNOSIS — I10 HYPERTENSION, UNSPECIFIED TYPE: ICD-10-CM

## 2025-01-12 RX ORDER — OLMESARTAN MEDOXOMIL 20 MG/1
20 TABLET ORAL DAILY
Qty: 90 TABLET | Refills: 2 | Status: SHIPPED | OUTPATIENT
Start: 2025-01-12

## 2025-01-25 ENCOUNTER — PATIENT MESSAGE (OUTPATIENT)
Dept: FAMILY MEDICINE CLINIC | Facility: CLINIC | Age: 50
End: 2025-01-25

## 2025-02-01 RX ORDER — METOPROLOL SUCCINATE 25 MG/1
25 TABLET, EXTENDED RELEASE ORAL DAILY
Qty: 90 TABLET | Refills: 1 | Status: SHIPPED | OUTPATIENT
Start: 2025-02-01

## 2025-04-05 DIAGNOSIS — I10 HYPERTENSION, UNSPECIFIED TYPE: ICD-10-CM

## 2025-04-07 RX ORDER — OLMESARTAN MEDOXOMIL 20 MG/1
20 TABLET ORAL DAILY
Qty: 90 TABLET | Refills: 1 | Status: SHIPPED | OUTPATIENT
Start: 2025-04-07

## 2025-04-27 RX ORDER — METOPROLOL SUCCINATE 25 MG/1
25 TABLET, EXTENDED RELEASE ORAL DAILY
Qty: 90 TABLET | Refills: 1 | Status: SHIPPED | OUTPATIENT
Start: 2025-04-27

## 2025-06-26 DIAGNOSIS — I10 HYPERTENSION, UNSPECIFIED TYPE: ICD-10-CM

## 2025-06-30 RX ORDER — OLMESARTAN MEDOXOMIL 20 MG/1
20 TABLET ORAL DAILY
Qty: 90 TABLET | Refills: 1 | OUTPATIENT
Start: 2025-06-30

## 2025-06-30 NOTE — TELEPHONE ENCOUNTER
olmesartan 20 MG Oral Tab 90 tablet 1 4/7/2025 --    Sig - Route: Take 1 tablet (20 mg total) by mouth daily. - Oral    Sent to pharmacy as: Olmesartan Medoxomil 20 MG Oral Tablet (Benicar)    E-Prescribing Status: Receipt confirmed by pharmacy (4/7/2025 10:55 AM CDT)      Associated Diagnoses    Hypertension, unspecified type        Pharmacy    [id number] [address] AT [facility] [address], [phone], [phone]

## 2025-08-01 DIAGNOSIS — I10 HYPERTENSION, UNSPECIFIED TYPE: ICD-10-CM

## 2025-08-01 RX ORDER — OLMESARTAN MEDOXOMIL 20 MG/1
20 TABLET ORAL DAILY
Qty: 90 TABLET | Refills: 1 | Status: SHIPPED | OUTPATIENT
Start: 2025-08-01

## 2025-08-01 RX ORDER — METOPROLOL SUCCINATE 25 MG/1
25 TABLET, EXTENDED RELEASE ORAL DAILY
Qty: 90 TABLET | Refills: 1 | Status: SHIPPED | OUTPATIENT
Start: 2025-08-01